# Patient Record
Sex: FEMALE | Race: WHITE | NOT HISPANIC OR LATINO | Employment: FULL TIME | URBAN - METROPOLITAN AREA
[De-identification: names, ages, dates, MRNs, and addresses within clinical notes are randomized per-mention and may not be internally consistent; named-entity substitution may affect disease eponyms.]

---

## 2024-08-26 ENCOUNTER — APPOINTMENT (OUTPATIENT)
Dept: RHEUMATOLOGY | Facility: CLINIC | Age: 34
End: 2024-08-26
Payer: COMMERCIAL

## 2024-08-26 ENCOUNTER — OFFICE VISIT (OUTPATIENT)
Dept: RHEUMATOLOGY | Facility: CLINIC | Age: 34
End: 2024-08-26
Payer: COMMERCIAL

## 2024-08-26 VITALS — SYSTOLIC BLOOD PRESSURE: 112 MMHG | DIASTOLIC BLOOD PRESSURE: 72 MMHG | WEIGHT: 149 LBS

## 2024-08-26 DIAGNOSIS — R76.8 POSITIVE ANA (ANTINUCLEAR ANTIBODY): Primary | ICD-10-CM

## 2024-08-26 PROBLEM — H00.19 CHALAZION: Status: ACTIVE | Noted: 2017-09-21

## 2024-08-26 PROBLEM — J02.9 SORE THROAT: Status: ACTIVE | Noted: 2023-03-23

## 2024-08-26 PROBLEM — R50.9 FEVER: Status: ACTIVE | Noted: 2023-03-23

## 2024-08-26 PROBLEM — R45.851 SUICIDAL IDEATION: Status: ACTIVE | Noted: 2023-09-21

## 2024-08-26 PROCEDURE — 99204 OFFICE O/P NEW MOD 45 MIN: CPT | Performed by: INTERNAL MEDICINE

## 2024-08-26 PROCEDURE — 1036F TOBACCO NON-USER: CPT | Performed by: INTERNAL MEDICINE

## 2024-08-26 RX ORDER — ACYCLOVIR 400 MG/1
400 TABLET ORAL
COMMUNITY

## 2024-08-26 RX ORDER — ZIPRASIDONE HYDROCHLORIDE 20 MG/1
20 CAPSULE ORAL
COMMUNITY
Start: 2023-12-07 | End: 2024-08-26

## 2024-08-26 RX ORDER — TRAZODONE HYDROCHLORIDE 50 MG/1
50 TABLET ORAL DAILY PRN
COMMUNITY
Start: 2023-09-25 | End: 2024-08-26

## 2024-08-26 RX ORDER — CETIRIZINE HYDROCHLORIDE 10 MG/1
1 TABLET ORAL DAILY
COMMUNITY

## 2024-08-26 RX ORDER — DOXYCYCLINE HYCLATE 100 MG
100 TABLET ORAL DAILY
COMMUNITY
Start: 2023-08-19

## 2024-08-26 RX ORDER — ONDANSETRON 4 MG/1
4 TABLET, ORALLY DISINTEGRATING ORAL EVERY 8 HOURS PRN
COMMUNITY
Start: 2024-02-07

## 2024-08-26 RX ORDER — EPINEPHRINE 0.3 MG/.3ML
1 INJECTION SUBCUTANEOUS ONCE AS NEEDED
COMMUNITY
Start: 2024-08-06

## 2024-08-26 RX ORDER — BUSPIRONE HYDROCHLORIDE 10 MG/1
1 TABLET ORAL
COMMUNITY
Start: 2023-09-02 | End: 2024-08-26

## 2024-08-26 RX ORDER — ARIPIPRAZOLE 10 MG/1
10 TABLET ORAL EVERY EVENING
COMMUNITY
Start: 2023-10-20 | End: 2024-08-26

## 2024-08-26 RX ORDER — NEOMYCIN SULFATE, POLYMYXIN B SULFATE, HYDROCORTISONE 3.5; 10000; 1 MG/ML; [USP'U]/ML; MG/ML
SOLUTION/ DROPS AURICULAR (OTIC)
COMMUNITY
Start: 2023-08-19

## 2024-08-26 RX ORDER — HYDROXYZINE PAMOATE 50 MG/1
50 CAPSULE ORAL EVERY 6 HOURS PRN
COMMUNITY
Start: 2023-09-25

## 2024-08-26 RX ORDER — TRIAMCINOLONE ACETONIDE 1 MG/G
OINTMENT TOPICAL
COMMUNITY
Start: 2024-04-25

## 2024-08-26 RX ORDER — MONTELUKAST SODIUM 10 MG/1
1 TABLET ORAL DAILY
COMMUNITY

## 2024-08-26 RX ORDER — FLUCONAZOLE 150 MG/1
150 TABLET ORAL ONCE
COMMUNITY
Start: 2023-08-19

## 2024-08-26 RX ORDER — VENLAFAXINE HYDROCHLORIDE 150 MG/1
150 CAPSULE, EXTENDED RELEASE ORAL
COMMUNITY

## 2024-08-26 RX ORDER — ARIPIPRAZOLE 5 MG/1
5 TABLET ORAL DAILY
COMMUNITY
End: 2024-08-26

## 2024-08-26 ASSESSMENT — PAIN SCALES - GENERAL: PAINLEVEL: 0-NO PAIN

## 2024-08-26 NOTE — PROGRESS NOTES
Subjective  . Shantelle Nixon is a 33 y.o. female who presents for SLE positive.    HPI.  33-year-old female with history of anxiety, depression, SI, PTSD and asthma referred for positive LEIGH ANN.    She states that she has been having off-and-on skin rashes with itching involving the chest, arms for the past 1 year.  She believes that she has had MRSA.  She states since July of this year she has been having photosensitivity, itching and excessive tearing.  She states she has normal eye exam in May.  She stated that she has had bad diarrhea with white speckles  in April that has now subsided.      She reports hair thinning.  She reports cold sores for that she takes valacyclovir.  She believes she has Raynaud's phenomenon.    She has no history of pleuritic chest pain, shortness of breath, fever, chills, night sweats, weight loss, dry eyes, dry mouth, photosensitivity, uveitis iritis, joint pain and swelling, hematemesis, hemoptysis easy bruising and bleeding.    Social history: She denies alcohol use or smoking.  She uses medical marijuana in the form of Gummies.  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  Family history: Both parents has had diabetes.  Father also had high blood pressure and issue with alcohol and drug use.  Surgical history:  section, adenoidectomy, tonsillectomy, vesicoureteral surgery, wisdom teeth extraction and rhinoseptoplasty.    Review of Systems   All other systems reviewed and are negative.    Objective     Blood pressure 112/72, weight 67.6 kg (149 lb).    Physical Exam  Gen. AAO x3, NAD.  HEENT: No pallor or icterus, PERRLA, EOMI. Oropharynx is clear. MM moist,Parotid glands  not enlarged. No cervical lymphadenopathy .  Skin: No rashes.  Heart: S1, S2/ RRR. No murmurs or gallops.  Lungs: CTA B.  Abdomen: Soft, NT/ND, BS regular.  MSK: No.swelling or tenderness of the  upper or lower extremity joints with full ROM, Neck,spine and Carson SI with out tenderness.  Neuro: CN II-XII intact. Sensation to  touch intact.Strength 5/5 throughout. DTR 2+ and symmetrical.  Psych:Appropriate mood and behavior  EXT: No edema    Assessment/Plan . 33-year-old female with history of anxiety, depression, SI, PTSD and asthma presented to the [facility] physician with intermittent skin rash, and diarrhea with white specks about 4 months ago.  Blood work obtained showed positive LEIGH ANN at 1: 60 with negative rheumatoid factor.  Sed rate was within normal limits.  CBC and CMP were satisfactory.    Discussed with patient it appears she has nonspecific positive LEIGH ANN however will obtain labs for further evaluation.    We will communicate with the patient with results.     This note was partially generated using the Dragon Voice recognition system. There may be some incorrect wording, spelling and/or spelling errors or punctuation errors that were not corrected prior to committing the note to the medical record.    Problem List Items Addressed This Visit    None  Visit Diagnoses       Positive LEIGH ANN (antinuclear antibody)    -  Primary    Relevant Orders    DAVID Panel    C4 Complement    C3 Complement                 Active Ambulatory Problems     Diagnosis Date Noted    Chalazion 09/21/2017    Fever 03/23/2023    Sore throat 03/23/2023    Suicidal ideation 09/21/2023     Resolved Ambulatory Problems     Diagnosis Date Noted    No Resolved Ambulatory Problems     No Additional Past Medical History       No family history on file.    History reviewed. No pertinent surgical history.    Social History     Tobacco Use   Smoking Status Never   Smokeless Tobacco Never       Allergies  Iodinated contrast media    Current Meds  Current Outpatient Medications   Medication Instructions    acyclovir (ZOVIRAX) 400 mg, oral    cetirizine (ZyrTEC) 10 mg tablet 1 tablet, oral, Daily    doxycycline (VIBRA-TABS) 100 mg, oral, Daily    EPINEPHrine 0.3 mg/0.3 mL injection syringe 1 Syringe, intramuscular, Once as needed    fluconazole (DIFLUCAN) 150 mg, oral,  Once    hydrOXYzine pamoate (VISTARIL) 50 mg, oral, Every 6 hours PRN    montelukast (Singulair) 10 mg tablet 1 tablet, oral, Daily    neomycin-polymyxin-HC (Cortisporin) otic solution Hydrocortisone/neomycin/polymyxin B 1%-0.35%-10,000 units/mL otic solution    ondansetron ODT (ZOFRAN-ODT) 4 mg, oral, Every 8 hours PRN    triamcinolone (Kenalog) 0.1 % ointment 1 APPLICATION EXTERNALLY TWICE A DAY 30 DAYS    venlafaxine XR (EFFEXOR-XR) 150 mg, oral, Once daily with food        Dylan Swift MD

## 2024-08-27 ENCOUNTER — LAB (OUTPATIENT)
Dept: LAB | Facility: LAB | Age: 34
End: 2024-08-27
Payer: COMMERCIAL

## 2024-08-27 DIAGNOSIS — R76.8 POSITIVE ANA (ANTINUCLEAR ANTIBODY): ICD-10-CM

## 2024-08-27 PROCEDURE — 36415 COLL VENOUS BLD VENIPUNCTURE: CPT

## 2024-08-27 PROCEDURE — 86160 COMPLEMENT ANTIGEN: CPT

## 2024-08-27 PROCEDURE — 86225 DNA ANTIBODY NATIVE: CPT

## 2024-08-27 PROCEDURE — 86235 NUCLEAR ANTIGEN ANTIBODY: CPT

## 2024-08-28 LAB
C3 SERPL-MCNC: 125 MG/DL (ref 87–200)
C4 SERPL-MCNC: 30 MG/DL (ref 10–50)
CENTROMERE B AB SER-ACNC: <0.2 AI
CHROMATIN AB SERPL-ACNC: <0.2 AI
DSDNA AB SER-ACNC: 3 IU/ML
ENA JO1 AB SER QL IA: <0.2 AI
ENA RNP AB SER IA-ACNC: <0.2 AI
ENA SCL70 AB SER QL IA: <0.2 AI
ENA SM AB SER IA-ACNC: <0.2 AI
ENA SM+RNP AB SER QL IA: <0.2 AI
ENA SS-A AB SER IA-ACNC: <0.2 AI
ENA SS-B AB SER IA-ACNC: <0.2 AI
RIBOSOMAL P AB SER-ACNC: <0.2 AI

## 2024-10-15 ENCOUNTER — TELEPHONE (OUTPATIENT)
Dept: GASTROENTEROLOGY | Facility: CLINIC | Age: 34
End: 2024-10-15
Payer: COMMERCIAL

## 2024-10-16 ENCOUNTER — APPOINTMENT (OUTPATIENT)
Dept: RADIOLOGY | Facility: CLINIC | Age: 34
End: 2024-10-16
Payer: COMMERCIAL

## 2024-11-06 ENCOUNTER — APPOINTMENT (OUTPATIENT)
Dept: RHEUMATOLOGY | Facility: CLINIC | Age: 34
End: 2024-11-06
Payer: COMMERCIAL

## 2024-12-18 ENCOUNTER — APPOINTMENT (OUTPATIENT)
Dept: GASTROENTEROLOGY | Facility: CLINIC | Age: 34
End: 2024-12-18
Payer: COMMERCIAL

## 2024-12-18 VITALS
HEART RATE: 84 BPM | SYSTOLIC BLOOD PRESSURE: 120 MMHG | DIASTOLIC BLOOD PRESSURE: 70 MMHG | HEIGHT: 64 IN | BODY MASS INDEX: 25.61 KG/M2 | WEIGHT: 150 LBS | OXYGEN SATURATION: 97 %

## 2024-12-18 DIAGNOSIS — R10.13 EPIGASTRIC ABDOMINAL PAIN: Primary | ICD-10-CM

## 2024-12-18 DIAGNOSIS — R11.2 NAUSEA AND VOMITING, UNSPECIFIED VOMITING TYPE: ICD-10-CM

## 2024-12-18 PROCEDURE — 99204 OFFICE O/P NEW MOD 45 MIN: CPT | Performed by: INTERNAL MEDICINE

## 2024-12-18 PROCEDURE — 3008F BODY MASS INDEX DOCD: CPT | Performed by: INTERNAL MEDICINE

## 2024-12-18 NOTE — H&P (VIEW-ONLY)
[facility]    ASSESSMENT and PLAN:       Shantelle Nixon is a 34 y.o. female with a significant past medical history of anxiety, seasonal allergies who presents for consultation requested by her primary care provider (YUDITH Puri) for the evaluation of nausea vomiting epigastric pain.         1, Epigastric Pain/intractable nausea vomiting  -Cyclic vomiting syndrome patient consuming 350 mg THC daily PUD vs biliary vs gastroparesis vs functional dyspepsia   - EGD for evaluation if negative consider gastric emptying study   - Risk and benefits of EGD including bleeding perforation and infection were discussed with patient and they wish to proceed              Yung Eisenberg DO         Gastroenterology    [facility]            Subjective   HISTORY OF PRESENT ILLNESS:     Chief Complaint  Abdominal Pain (Stools are yellow /Stool testing 8/20/24, US & CT 10/10/24/Colon at age 17, no EGD /Paternal grandfather - colon cancer , maternal grandfather - pancreatic cancer)    History Of Present Illness:    Shantelle Nixon is a 34 y.o. female with a significant past medical history of anxiety, seasonal allergies who presents for consultation requested by her primary care provider (YUDITH Puri) for the evaluation of nausea vomiting epigastric pain.       Patient presents for evaluation epigastric pain it has been ongoing since last fall.  She states she has had intermittent nausea vomiting along some epigastric pain.  She states got progressively worse.  She is undergone right quadrant ultrasound and CT abdomen pelvis not show any acute pathology.  She states happens and primary in the morning in the evenings.  She does states she does use marijuana and consumes 350 mg of THC daily in the form of edibles.          Patient denies any heartburn/GERD, dysphagia, odynophagia, abdominal pain, diarrhea, constipation, hematemesis,  ----- Message from Dileep Milligan PA-C sent at 6/19/2020  4:31 PM CDT -----  Pap is normal with a negative HPV.  Repeat Pap smear in five years.   "hematochezia, melena, or weight loss.      Endoscopy History:    M[facility]s to remote history of colonoscopy at the age 17    Review of systems:   Review of Systems      I performed a complete 10 point review of systems and it is negative except as noted in HPI or above.        PAST HISTORIES:       Past Medical History:  She has no past medical history on file.    Past Surgical History:  She has no past surgical history on file.      Social History:  She reports that she has never smoked. She has never used smokeless tobacco. She reports that she does not currently use drugs. She reports that she does not drink alcohol.    Family History:  No known GI disease, specifically denies pancreatitis, Crohn's, colon cancer, gastroesophageal cancer, or ulcerative colitis.    No family history on file.     Allergies:  Iodinated contrast media        Objective   OBJECTIVE:       Last Recorded Vitals:  Vitals:    12/18/24 1323   BP: 120/70   Pulse: 84   SpO2: 97%   Weight: 68 kg (150 lb)   Height: 1.615 m (5' 3.58\")     /70   Pulse 84   Ht 1.615 m (5' 3.58\")   Wt 68 kg (150 lb)   SpO2 97%   BMI 26.09 kg/m²      Physical Exam:    Physical Exam  Vitals reviewed.   Constitutional:       General: She is awake.      Appearance: Normal appearance.   HENT:      Head: Normocephalic.      Mouth/Throat:      Mouth: Mucous membranes are moist.   Eyes:      Extraocular Movements: Extraocular movements intact.   Cardiovascular:      Rate and Rhythm: Normal rate.      Heart sounds: Normal heart sounds.   Pulmonary:      Effort: Pulmonary effort is normal.      Breath sounds: Normal breath sounds.   Abdominal:      General: Bowel sounds are normal.      Palpations: Abdomen is soft.      Tenderness: There is no abdominal tenderness. There is no guarding or rebound.      Hernia: No hernia is present.   Musculoskeletal:         General: Normal range of motion.      Cervical back: Neck supple.   Skin:     General: Skin is warm and dry. " "  Neurological:      General: No focal deficit present.      Mental Status: She is alert.   Psychiatric:         Attention and Perception: Attention and perception normal.         Mood and Affect: Mood normal.         Behavior: Behavior normal.           Home Medications:  Prior to Admission medications    Medication Sig Start Date End Date Taking? Authorizing Provider   acyclovir (Zovirax) 400 mg tablet Take 1 tablet (400 mg) by mouth.   Yes Historical Provider, MD   cetirizine (ZyrTEC) 10 mg tablet Take 1 tablet (10 mg) by mouth once daily.   Yes Historical Provider, MD   EPINEPHrine 0.3 mg/0.3 mL injection syringe Inject 0.3 mL (0.3 mg) into the muscle 1 time if needed. 8/6/24  Yes Historical Provider, MD   montelukast (Singulair) 10 mg tablet Take 1 tablet (10 mg) by mouth once daily.   Yes Historical Provider, MD   ondansetron ODT (Zofran-ODT) 4 mg disintegrating tablet Dissolve 1 tablet (4 mg) in the mouth every 8 hours if needed for vomiting or nausea. 2/7/24  Yes Historical Provider, MD   triamcinolone (Kenalog) 0.1 % ointment 1 APPLICATION EXTERNALLY TWICE A DAY 30 DAYS 4/25/24  Yes Historical Provider, MD   venlafaxine XR (Effexor-XR) 150 mg 24 hr capsule Take 187.5 mg by mouth. Once daily with food   Yes Historical Provider, MD   doxycycline (Vibra-Tabs) 100 mg tablet Take 1 tablet (100 mg) by mouth once daily. 8/19/23 12/18/24  Historical Provider, MD   fluconazole (Diflucan) 150 mg tablet Take 1 tablet (150 mg) by mouth 1 time. 8/19/23 12/18/24  Historical Provider, MD   hydrOXYzine pamoate (Vistaril) 50 mg capsule Take 1 capsule (50 mg) by mouth every 6 hours if needed. 9/25/23 12/18/24  Historical Provider, MD   neomycin-polymyxin-HC (Cortisporin) otic solution Hydrocortisone/neomycin/polymyxin B 1%-0.35%-10,000 units/mL otic solution 8/19/23 12/18/24  Historical Provider, MD         Relevant Results Recent labs reviewed in the EMR.  No results found for: \"HGB\", \"MCV\", \"PLT\"    No results found for: " "\"FERRITIN\", \"IRON\"    No results found for: \"NA\", \"K\", \"CL\", \"BUN\", \"CREATININE\"    No results found for: \"BILITOT\"  No results found for: \"ALT\", \"AST\", \"ALKPHOS\"    No results found for: \"CRP\"    No results found for: \"CALPS\"    Radiology: Reviewed imaging reviewed in the EMR.  CT ABDOMEN AND PELVIS WITH CONTRAST  EXAM DATE AND TIME:  10/10/2024 2:12 PM EDT  INDICATION: 34 years Female with severe abd pain. Nausea and vomiting.  COMPARISON: CT abdomen pelvis from 2009  TECHNIQUE: Transaxial sequence through the abdomen and pelvis with 3 mm reconstruction with dynamic intravenous infusion of intravenous contrast media. Coronal and sagittal reconstructions included. Dose reduction was employed with automated exposure control. Dose reduction was employed with automated exposure control.    FINDINGS:    Chest base: Normal.    Liver: Normal size and contour. 5 mm left hepatic lobe hypodensity is too small to definitively characterize, but likely represents a small cyst. No follow-up imaging is recommended.    Biliary tree:  No calcified gallstones. No significant biliary ductal dilatation. No pericholecystic fluid..    Pancreas: Normal.    Spleen: Normal.      Adrenals: Normal.    Kidneys: Symmetric contrast enhancement without hydronephrosis.  No focal lesion.    Free fluid:  Trace free fluid within the pelvis, within normal physiologic range.      Vasculature: Normal caliber.    Bowel: No bowel obstruction. Normal appendix. No bowel wall pneumatosis..    Lymphadenopathy: None.    Pelvic organs/viscera: Uterus is retroverted and retroflexed. Urinary bladder appears within normal limits. No adnexal mass.    Osseous structures: No abnormality.    Soft Tissues: Normal.  Procedure Note    Mariel Wolff MD - 10/10/2024  Formatting of this note might be different from the original.  Patient Name: OMER FLETCHER  : 1990  MRN: [id number]  Acct#: [id number]  Accession: [id number]  Exam Date/Time: 10/10/2024 " 14:12  Procedure: CT ABDOMEN PELVIS W CONTRAST  Ordering Provider: VEGA COURTNEY  Reason For Exam: severe abd pain      CT ABDOMEN AND PELVIS WITH CONTRAST  EXAM DATE AND TIME:  10/10/2024 2:12 PM EDT  INDICATION: 34 years Female with severe abd pain. Nausea and vomiting.  COMPARISON: CT abdomen pelvis from 9/25/2009  TECHNIQUE: Transaxial sequence through the abdomen and pelvis with 3 mm reconstruction with dynamic intravenous infusion of intravenous contrast media. Coronal and sagittal reconstructions included. Dose reduction was employed with automated exposure control. Dose reduction was employed with automated exposure control.    FINDINGS:    Chest base: Normal.    Liver: Normal size and contour. 5 mm left hepatic lobe hypodensity is too small to definitively characterize, but likely represents a small cyst. No follow-up imaging is recommended.    Biliary tree:  No calcified gallstones. No significant biliary ductal dilatation. No pericholecystic fluid..    Pancreas: Normal.    Spleen: Normal.      Adrenals: Normal.    Kidneys: Symmetric contrast enhancement without hydronephrosis.  No focal lesion.    Free fluid:  Trace free fluid within the pelvis, within normal physiologic range.      Vasculature: Normal caliber.    Bowel: No bowel obstruction. Normal appendix. No bowel wall pneumatosis..    Lymphadenopathy: None.    Pelvic organs/viscera: Uterus is retroverted and retroflexed. Urinary bladder appears within normal limits. No adnexal mass.    Osseous structures: No abnormality.    Soft Tissues: Normal.    IMPRESSION:  No acute abnormality within the abdomen/pelvis.       Examination:   Right upper quadrant Ultrasound     Indication:   upper abd pain, elevated AST/ALT     Findings:   Multiple static gray scale and color Doppler sonographic images of the right upper abdomen are submitted for interpretation.     Small hepatic cyst in the left lobe measures approximately 8mm. Otherwise, the liver  is unremarkable on the provided images.  There is no intrahepatic biliary ductal dilatation appreciated.       The gallbladder is unremarkable, without evidence of gallstones, pericholecystic fluid or gallbladder wall thickening.  The common duct measures 4.6 mm.   Negative sonographic Andrews's sign was documented by the sonographer.       The pancreas is not well visualized on this study secondary to bowel gas or technical factors.     The right kidney measures  9.2 x 5.2 x 4.4 cm on screening images and does not demonstrate evidence of hydronephrosis.

## 2024-12-18 NOTE — PATIENT INSTRUCTIONS
You have been scheduled for an upper endoscopy (EGD).  You were given instructions for preparing for this test in the office today.  If you have questions about these instructions, please call my office at [phone].    After your procedure, you can expect me to talk to you to go over the results of the procedure.    You were also given information regarding the schedule for your procedure including the time that you need to arrive to the endoscopy unit.  You will also be contacted 2-3 day prior to your procedure to confirm the final arrival time.  If you have questions about this or if you need to cancel or change this appointment please call my office at [phone].

## 2024-12-18 NOTE — PROGRESS NOTES
[facility]    ASSESSMENT and PLAN:       Shantelle Nixon is a 34 y.o. female with a significant past medical history of anxiety, seasonal allergies who presents for consultation requested by her primary care provider (YUDITH Puri) for the evaluation of nausea vomiting epigastric pain.         1, Epigastric Pain/intractable nausea vomiting  -Cyclic vomiting syndrome patient consuming 350 mg THC daily PUD vs biliary vs gastroparesis vs functional dyspepsia   - EGD for evaluation if negative consider gastric emptying study   - Risk and benefits of EGD including bleeding perforation and infection were discussed with patient and they wish to proceed              Yung Eisenberg DO         Gastroenterology    [facility]            Subjective   HISTORY OF PRESENT ILLNESS:     Chief Complaint  Abdominal Pain (Stools are yellow /Stool testing 8/20/24, US & CT 10/10/24/Colon at age 17, no EGD /Paternal grandfather - colon cancer , maternal grandfather - pancreatic cancer)    History Of Present Illness:    Shanetlle Nixon is a 34 y.o. female with a significant past medical history of anxiety, seasonal allergies who presents for consultation requested by her primary care provider (YUDITH Puri) for the evaluation of nausea vomiting epigastric pain.       Patient presents for evaluation epigastric pain it has been ongoing since last fall.  She states she has had intermittent nausea vomiting along some epigastric pain.  She states got progressively worse.  She is undergone right quadrant ultrasound and CT abdomen pelvis not show any acute pathology.  She states happens and primary in the morning in the evenings.  She does states she does use marijuana and consumes 350 mg of THC daily in the form of edibles.          Patient denies any heartburn/GERD, dysphagia, odynophagia, abdominal pain, diarrhea, constipation, hematemesis,  Detail Level: Detailed "hematochezia, melena, or weight loss.      Endoscopy History:    Mitts to remote history of colonoscopy at the age 17    Review of systems:   Review of Systems      I performed a complete 10 point review of systems and it is negative except as noted in HPI or above.        PAST HISTORIES:       Past Medical History:  She has no past medical history on file.    Past Surgical History:  She has no past surgical history on file.      Social History:  She reports that she has never smoked. She has never used smokeless tobacco. She reports that she does not currently use drugs. She reports that she does not drink alcohol.    Family History:  No known GI disease, specifically denies pancreatitis, Crohn's, colon cancer, gastroesophageal cancer, or ulcerative colitis.    No family history on file.     Allergies:  Iodinated contrast media        Objective   OBJECTIVE:       Last Recorded Vitals:  Vitals:    12/18/24 1323   BP: 120/70   Pulse: 84   SpO2: 97%   Weight: 68 kg (150 lb)   Height: 1.615 m (5' 3.58\")     /70   Pulse 84   Ht 1.615 m (5' 3.58\")   Wt 68 kg (150 lb)   SpO2 97%   BMI 26.09 kg/m²      Physical Exam:    Physical Exam  Vitals reviewed.   Constitutional:       General: She is awake.      Appearance: Normal appearance.   HENT:      Head: Normocephalic.      Mouth/Throat:      Mouth: Mucous membranes are moist.   Eyes:      Extraocular Movements: Extraocular movements intact.   Cardiovascular:      Rate and Rhythm: Normal rate.      Heart sounds: Normal heart sounds.   Pulmonary:      Effort: Pulmonary effort is normal.      Breath sounds: Normal breath sounds.   Abdominal:      General: Bowel sounds are normal.      Palpations: Abdomen is soft.      Tenderness: There is no abdominal tenderness. There is no guarding or rebound.      Hernia: No hernia is present.   Musculoskeletal:         General: Normal range of motion.      Cervical back: Neck supple.   Skin:     General: Skin is warm and dry. " Quality 131: Pain Assessment And Follow-Up: Pain assessment using a standardized tool is documented as negative, no follow-up plan required "  Neurological:      General: No focal deficit present.      Mental Status: She is alert.   Psychiatric:         Attention and Perception: Attention and perception normal.         Mood and Affect: Mood normal.         Behavior: Behavior normal.           Home Medications:  Prior to Admission medications    Medication Sig Start Date End Date Taking? Authorizing Provider   acyclovir (Zovirax) 400 mg tablet Take 1 tablet (400 mg) by mouth.   Yes Historical Provider, MD   cetirizine (ZyrTEC) 10 mg tablet Take 1 tablet (10 mg) by mouth once daily.   Yes Historical Provider, MD   EPINEPHrine 0.3 mg/0.3 mL injection syringe Inject 0.3 mL (0.3 mg) into the muscle 1 time if needed. 8/6/24  Yes Historical Provider, MD   montelukast (Singulair) 10 mg tablet Take 1 tablet (10 mg) by mouth once daily.   Yes Historical Provider, MD   ondansetron ODT (Zofran-ODT) 4 mg disintegrating tablet Dissolve 1 tablet (4 mg) in the mouth every 8 hours if needed for vomiting or nausea. 2/7/24  Yes Historical Provider, MD   triamcinolone (Kenalog) 0.1 % ointment 1 APPLICATION EXTERNALLY TWICE A DAY 30 DAYS 4/25/24  Yes Historical Provider, MD   venlafaxine XR (Effexor-XR) 150 mg 24 hr capsule Take 187.5 mg by mouth. Once daily with food   Yes Historical Provider, MD   doxycycline (Vibra-Tabs) 100 mg tablet Take 1 tablet (100 mg) by mouth once daily. 8/19/23 12/18/24  Historical Provider, MD   fluconazole (Diflucan) 150 mg tablet Take 1 tablet (150 mg) by mouth 1 time. 8/19/23 12/18/24  Historical Provider, MD   hydrOXYzine pamoate (Vistaril) 50 mg capsule Take 1 capsule (50 mg) by mouth every 6 hours if needed. 9/25/23 12/18/24  Historical Provider, MD   neomycin-polymyxin-HC (Cortisporin) otic solution Hydrocortisone/neomycin/polymyxin B 1%-0.35%-10,000 units/mL otic solution 8/19/23 12/18/24  Historical Provider, MD         Relevant Results Recent labs reviewed in the EMR.  No results found for: \"HGB\", \"MCV\", \"PLT\"    No results found for: " Quality 110: Preventive Care And Screening: Influenza Immunization: Influenza Immunization Administered during Influenza season "\"FERRITIN\", \"IRON\"    No results found for: \"NA\", \"K\", \"CL\", \"BUN\", \"CREATININE\"    No results found for: \"BILITOT\"  No results found for: \"ALT\", \"AST\", \"ALKPHOS\"    No results found for: \"CRP\"    No results found for: \"CALPS\"    Radiology: Reviewed imaging reviewed in the EMR.  CT ABDOMEN AND PELVIS WITH CONTRAST  EXAM DATE AND TIME:  10/10/2024 2:12 PM EDT  INDICATION: 34 years Female with severe abd pain. Nausea and vomiting.  COMPARISON: CT abdomen pelvis from 2009  TECHNIQUE: Transaxial sequence through the abdomen and pelvis with 3 mm reconstruction with dynamic intravenous infusion of intravenous contrast media. Coronal and sagittal reconstructions included. Dose reduction was employed with automated exposure control. Dose reduction was employed with automated exposure control.    FINDINGS:    Chest base: Normal.    Liver: Normal size and contour. 5 mm left hepatic lobe hypodensity is too small to definitively characterize, but likely represents a small cyst. No follow-up imaging is recommended.    Biliary tree:  No calcified gallstones. No significant biliary ductal dilatation. No pericholecystic fluid..    Pancreas: Normal.    Spleen: Normal.      Adrenals: Normal.    Kidneys: Symmetric contrast enhancement without hydronephrosis.  No focal lesion.    Free fluid:  Trace free fluid within the pelvis, within normal physiologic range.      Vasculature: Normal caliber.    Bowel: No bowel obstruction. Normal appendix. No bowel wall pneumatosis..    Lymphadenopathy: None.    Pelvic organs/viscera: Uterus is retroverted and retroflexed. Urinary bladder appears within normal limits. No adnexal mass.    Osseous structures: No abnormality.    Soft Tissues: Normal.  Procedure Note    Mariel Wolff MD - 10/10/2024  Formatting of this note might be different from the original.  Patient Name: OMER FLETCHER  : 1990  MRN: [id number]  Acct#: [id number]  Accession: [id number]  Exam Date/Time: 10/10/2024 " 14:12  Procedure: CT ABDOMEN PELVIS W CONTRAST  Ordering Provider: VEGA COURTNEY  Reason For Exam: severe abd pain      CT ABDOMEN AND PELVIS WITH CONTRAST  EXAM DATE AND TIME:  10/10/2024 2:12 PM EDT  INDICATION: 34 years Female with severe abd pain. Nausea and vomiting.  COMPARISON: CT abdomen pelvis from 9/25/2009  TECHNIQUE: Transaxial sequence through the abdomen and pelvis with 3 mm reconstruction with dynamic intravenous infusion of intravenous contrast media. Coronal and sagittal reconstructions included. Dose reduction was employed with automated exposure control. Dose reduction was employed with automated exposure control.    FINDINGS:    Chest base: Normal.    Liver: Normal size and contour. 5 mm left hepatic lobe hypodensity is too small to definitively characterize, but likely represents a small cyst. No follow-up imaging is recommended.    Biliary tree:  No calcified gallstones. No significant biliary ductal dilatation. No pericholecystic fluid..    Pancreas: Normal.    Spleen: Normal.      Adrenals: Normal.    Kidneys: Symmetric contrast enhancement without hydronephrosis.  No focal lesion.    Free fluid:  Trace free fluid within the pelvis, within normal physiologic range.      Vasculature: Normal caliber.    Bowel: No bowel obstruction. Normal appendix. No bowel wall pneumatosis..    Lymphadenopathy: None.    Pelvic organs/viscera: Uterus is retroverted and retroflexed. Urinary bladder appears within normal limits. No adnexal mass.    Osseous structures: No abnormality.    Soft Tissues: Normal.    IMPRESSION:  No acute abnormality within the abdomen/pelvis.       Examination:   Right upper quadrant Ultrasound     Indication:   upper abd pain, elevated AST/ALT     Findings:   Multiple static gray scale and color Doppler sonographic images of the right upper abdomen are submitted for interpretation.     Small hepatic cyst in the left lobe measures approximately 8mm. Otherwise, the liver  is unremarkable on the provided images.  There is no intrahepatic biliary ductal dilatation appreciated.       The gallbladder is unremarkable, without evidence of gallstones, pericholecystic fluid or gallbladder wall thickening.  The common duct measures 4.6 mm.   Negative sonographic Andrews's sign was documented by the sonographer.       The pancreas is not well visualized on this study secondary to bowel gas or technical factors.     The right kidney measures  9.2 x 5.2 x 4.4 cm on screening images and does not demonstrate evidence of hydronephrosis.

## 2024-12-30 ENCOUNTER — ANESTHESIA EVENT (OUTPATIENT)
Dept: GASTROENTEROLOGY | Facility: HOSPITAL | Age: 34
End: 2024-12-30
Payer: COMMERCIAL

## 2024-12-31 ENCOUNTER — HOSPITAL ENCOUNTER (OUTPATIENT)
Dept: GASTROENTEROLOGY | Facility: HOSPITAL | Age: 34
Discharge: HOME | End: 2024-12-31
Payer: COMMERCIAL

## 2024-12-31 ENCOUNTER — ANESTHESIA (OUTPATIENT)
Dept: GASTROENTEROLOGY | Facility: HOSPITAL | Age: 34
End: 2024-12-31
Payer: COMMERCIAL

## 2024-12-31 VITALS
HEART RATE: 86 BPM | SYSTOLIC BLOOD PRESSURE: 111 MMHG | DIASTOLIC BLOOD PRESSURE: 79 MMHG | HEIGHT: 62 IN | TEMPERATURE: 97.8 F | BODY MASS INDEX: 27.6 KG/M2 | WEIGHT: 150 LBS | OXYGEN SATURATION: 96 % | RESPIRATION RATE: 16 BRPM

## 2024-12-31 DIAGNOSIS — R11.2 NAUSEA AND VOMITING, UNSPECIFIED VOMITING TYPE: ICD-10-CM

## 2024-12-31 DIAGNOSIS — R10.13 EPIGASTRIC ABDOMINAL PAIN: ICD-10-CM

## 2024-12-31 PROBLEM — F41.9 ANXIETY: Status: ACTIVE | Noted: 2024-12-31

## 2024-12-31 PROCEDURE — 43235 EGD DIAGNOSTIC BRUSH WASH: CPT | Performed by: INTERNAL MEDICINE

## 2024-12-31 PROCEDURE — 3700000002 HC GENERAL ANESTHESIA TIME - EACH INCREMENTAL 1 MINUTE

## 2024-12-31 PROCEDURE — 3700000001 HC GENERAL ANESTHESIA TIME - INITIAL BASE CHARGE

## 2024-12-31 PROCEDURE — 7100000009 HC PHASE TWO TIME - INITIAL BASE CHARGE

## 2024-12-31 PROCEDURE — 7100000010 HC PHASE TWO TIME - EACH INCREMENTAL 1 MINUTE

## 2024-12-31 PROCEDURE — 2500000004 HC RX 250 GENERAL PHARMACY W/ HCPCS (ALT 636 FOR OP/ED): Performed by: NURSE ANESTHETIST, CERTIFIED REGISTERED

## 2024-12-31 RX ORDER — LIDOCAINE HYDROCHLORIDE 20 MG/ML
INJECTION, SOLUTION EPIDURAL; INFILTRATION; INTRACAUDAL; PERINEURAL AS NEEDED
Status: DISCONTINUED | OUTPATIENT
Start: 2024-12-31 | End: 2024-12-31

## 2024-12-31 RX ORDER — PROPOFOL 10 MG/ML
INJECTION, EMULSION INTRAVENOUS AS NEEDED
Status: DISCONTINUED | OUTPATIENT
Start: 2024-12-31 | End: 2024-12-31

## 2024-12-31 RX ORDER — SODIUM CHLORIDE 0.9 % (FLUSH) 0.9 %
SYRINGE (ML) INJECTION AS NEEDED
Status: DISCONTINUED | OUTPATIENT
Start: 2024-12-31 | End: 2024-12-31

## 2024-12-31 RX ORDER — FENTANYL CITRATE 50 UG/ML
INJECTION, SOLUTION INTRAMUSCULAR; INTRAVENOUS AS NEEDED
Status: DISCONTINUED | OUTPATIENT
Start: 2024-12-31 | End: 2024-12-31

## 2024-12-31 RX ADMIN — FENTANYL CITRATE 50 MCG: 50 INJECTION INTRAMUSCULAR; INTRAVENOUS at 09:31

## 2024-12-31 RX ADMIN — PROPOFOL 50 MG: 10 INJECTION, EMULSION INTRAVENOUS at 09:35

## 2024-12-31 RX ADMIN — PROPOFOL 100 MG: 10 INJECTION, EMULSION INTRAVENOUS at 09:31

## 2024-12-31 RX ADMIN — FENTANYL CITRATE 25 MCG: 50 INJECTION INTRAMUSCULAR; INTRAVENOUS at 09:35

## 2024-12-31 RX ADMIN — LIDOCAINE HYDROCHLORIDE 40 MG: 20 INJECTION, SOLUTION EPIDURAL; INFILTRATION; INTRACAUDAL; PERINEURAL at 09:31

## 2024-12-31 RX ADMIN — Medication 2 ML: at 09:31

## 2024-12-31 RX ADMIN — FENTANYL CITRATE 25 MCG: 50 INJECTION INTRAMUSCULAR; INTRAVENOUS at 09:34

## 2024-12-31 RX ADMIN — Medication 2 ML: at 09:34

## 2024-12-31 RX ADMIN — PROPOFOL 50 MG: 10 INJECTION, EMULSION INTRAVENOUS at 09:34

## 2024-12-31 RX ADMIN — Medication 2 ML: at 09:35

## 2024-12-31 SDOH — HEALTH STABILITY: MENTAL HEALTH: CURRENT SMOKER: 0

## 2024-12-31 ASSESSMENT — COLUMBIA-SUICIDE SEVERITY RATING SCALE - C-SSRS
6. HAVE YOU EVER DONE ANYTHING, STARTED TO DO ANYTHING, OR PREPARED TO DO ANYTHING TO END YOUR LIFE?: NO
1. IN THE PAST MONTH, HAVE YOU WISHED YOU WERE DEAD OR WISHED YOU COULD GO TO SLEEP AND NOT WAKE UP?: NO
2. HAVE YOU ACTUALLY HAD ANY THOUGHTS OF KILLING YOURSELF?: NO

## 2024-12-31 ASSESSMENT — PAIN SCALES - GENERAL
PAINLEVEL_OUTOF10: 0 - NO PAIN
PAIN_LEVEL: 0
PAINLEVEL_OUTOF10: 0 - NO PAIN
PAINLEVEL_OUTOF10: 0 - NO PAIN

## 2024-12-31 ASSESSMENT — PAIN - FUNCTIONAL ASSESSMENT: PAIN_FUNCTIONAL_ASSESSMENT: 0-10

## 2024-12-31 NOTE — ANESTHESIA PREPROCEDURE EVALUATION
Patient: Shantelle Nixon    Procedure Information       Anesthesia Start Date/Time: 12/31/24 0925    Scheduled providers: Yung Eisenberg DO    Procedure: EGD    Location:  [facility]            Relevant Problems   Anesthesia (within normal limits)      Cardiac (within normal limits)      Pulmonary (within normal limits)      Neuro   (+) Anxiety      GI (within normal limits)      /Renal (within normal limits)      Liver (within normal limits)      Endocrine (within normal limits)      Hematology (within normal limits)      Musculoskeletal (within normal limits)      HEENT (within normal limits)      ID (within normal limits)      Skin (within normal limits)      GYN (within normal limits)       Clinical information reviewed:   Tobacco  Allergies  Meds   Med Hx  Surg Hx   Fam Hx  Soc Hx        NPO Detail:  NPO/Void Status  Date of Last Liquid: 12/30/24  Time of Last Liquid: 2100  Date of Last Solid: 12/30/24  Time of Last Solid: 2300  Last Intake Type: Clear fluids         Physical Exam    Airway  Mallampati: II  TM distance: >3 FB  Neck ROM: full     Cardiovascular - normal exam  Rhythm: regular  Rate: normal     Dental - normal exam     Pulmonary - normal exam     Abdominal - normal exam             Anesthesia Plan    History of general anesthesia?: yes  History of complications of general anesthesia?: no    ASA 2     MAC     The patient is not a current smoker.    intravenous induction   Anesthetic plan and risks discussed with patient.

## 2024-12-31 NOTE — ANESTHESIA POSTPROCEDURE EVALUATION
Patient: Shantelle Nixon    Procedure Summary       Date: 12/31/24 Room / Location: [facility]    Anesthesia Start: 0925 Anesthesia Stop: 0942    Procedure: EGD Diagnosis:       Epigastric abdominal pain      Nausea and vomiting, unspecified vomiting type    Scheduled Providers: Yung Eisenberg DO Responsible Provider: VICTOR M Hankins    Anesthesia Type: MAC ASA Status: 2            Anesthesia Type: MAC    Vitals Value Taken Time   /77 12/31/24 0951   Temp 36.6 °C (97.8 °F) 12/31/24 0941   Pulse 93 12/31/24 0951   Resp 16 12/31/24 0951   SpO2 96 % 12/31/24 0951       Anesthesia Post Evaluation    Patient location during evaluation: bedside  Patient participation: complete - patient participated  Level of consciousness: awake and alert  Pain score: 0  Pain management: adequate  Airway patency: patent  Cardiovascular status: acceptable and hemodynamically stable  Respiratory status: acceptable  Hydration status: acceptable  Postoperative Nausea and Vomiting: none        There were no known notable events for this encounter.

## 2025-01-23 ENCOUNTER — HOSPITAL ENCOUNTER (OUTPATIENT)
Dept: RADIOLOGY | Facility: HOSPITAL | Age: 35
Discharge: HOME | End: 2025-01-23
Payer: COMMERCIAL

## 2025-01-23 DIAGNOSIS — R11.2 NAUSEA AND VOMITING, UNSPECIFIED VOMITING TYPE: ICD-10-CM

## 2025-01-23 PROCEDURE — A9541 TC99M SULFUR COLLOID: HCPCS | Performed by: RADIOLOGY

## 2025-01-23 PROCEDURE — 3430000001 HC RX 343 DIAGNOSTIC RADIOPHARMACEUTICALS: Performed by: RADIOLOGY

## 2025-01-23 PROCEDURE — 78264 GASTRIC EMPTYING IMG STUDY: CPT | Performed by: RADIOLOGY

## 2025-01-23 PROCEDURE — 78264 GASTRIC EMPTYING IMG STUDY: CPT

## 2025-01-23 RX ADMIN — TECHNETIUM TC 99M SULFUR COLLOID 1 MILLICURIE: KIT at 09:50

## 2025-02-13 ENCOUNTER — APPOINTMENT (OUTPATIENT)
Dept: RHEUMATOLOGY | Facility: CLINIC | Age: 35
End: 2025-02-13
Payer: COMMERCIAL

## 2025-07-05 ENCOUNTER — HOSPITAL ENCOUNTER (EMERGENCY)
Facility: HOSPITAL | Age: 35
Discharge: HOME | End: 2025-07-06
Attending: STUDENT IN AN ORGANIZED HEALTH CARE EDUCATION/TRAINING PROGRAM
Payer: COMMERCIAL

## 2025-07-05 ENCOUNTER — APPOINTMENT (OUTPATIENT)
Dept: RADIOLOGY | Facility: HOSPITAL | Age: 35
End: 2025-07-05
Payer: COMMERCIAL

## 2025-07-05 DIAGNOSIS — R55 SYNCOPE, UNSPECIFIED SYNCOPE TYPE: ICD-10-CM

## 2025-07-05 DIAGNOSIS — E87.6 HYPOKALEMIA: Primary | ICD-10-CM

## 2025-07-05 LAB
BASOPHILS # BLD AUTO: 0.07 X10*3/UL (ref 0–0.1)
BASOPHILS NFR BLD AUTO: 0.9 %
EOSINOPHIL # BLD AUTO: 0.37 X10*3/UL (ref 0–0.7)
EOSINOPHIL NFR BLD AUTO: 4.6 %
ERYTHROCYTE [DISTWIDTH] IN BLOOD BY AUTOMATED COUNT: 10.9 % (ref 11.5–14.5)
HCT VFR BLD AUTO: 39.4 % (ref 36–46)
HGB BLD-MCNC: 13.7 G/DL (ref 12–16)
IMM GRANULOCYTES # BLD AUTO: 0.02 X10*3/UL (ref 0–0.7)
IMM GRANULOCYTES NFR BLD AUTO: 0.3 % (ref 0–0.9)
LYMPHOCYTES # BLD AUTO: 2.85 X10*3/UL (ref 1.2–4.8)
LYMPHOCYTES NFR BLD AUTO: 35.8 %
MCH RBC QN AUTO: 30.9 PG (ref 26–34)
MCHC RBC AUTO-ENTMCNC: 34.8 G/DL (ref 32–36)
MCV RBC AUTO: 89 FL (ref 80–100)
MONOCYTES # BLD AUTO: 0.65 X10*3/UL (ref 0.1–1)
MONOCYTES NFR BLD AUTO: 8.2 %
NEUTROPHILS # BLD AUTO: 4.01 X10*3/UL (ref 1.2–7.7)
NEUTROPHILS NFR BLD AUTO: 50.2 %
NRBC BLD-RTO: 0 /100 WBCS (ref 0–0)
PLATELET # BLD AUTO: 254 X10*3/UL (ref 150–450)
RBC # BLD AUTO: 4.44 X10*6/UL (ref 4–5.2)
WBC # BLD AUTO: 8 X10*3/UL (ref 4.4–11.3)

## 2025-07-05 PROCEDURE — 93005 ELECTROCARDIOGRAM TRACING: CPT

## 2025-07-05 PROCEDURE — 36415 COLL VENOUS BLD VENIPUNCTURE: CPT | Performed by: STUDENT IN AN ORGANIZED HEALTH CARE EDUCATION/TRAINING PROGRAM

## 2025-07-05 PROCEDURE — 85025 COMPLETE CBC W/AUTO DIFF WBC: CPT | Performed by: STUDENT IN AN ORGANIZED HEALTH CARE EDUCATION/TRAINING PROGRAM

## 2025-07-05 PROCEDURE — 71046 X-RAY EXAM CHEST 2 VIEWS: CPT

## 2025-07-05 PROCEDURE — 83880 ASSAY OF NATRIURETIC PEPTIDE: CPT | Performed by: STUDENT IN AN ORGANIZED HEALTH CARE EDUCATION/TRAINING PROGRAM

## 2025-07-05 PROCEDURE — 80048 BASIC METABOLIC PNL TOTAL CA: CPT | Performed by: STUDENT IN AN ORGANIZED HEALTH CARE EDUCATION/TRAINING PROGRAM

## 2025-07-05 PROCEDURE — 99285 EMERGENCY DEPT VISIT HI MDM: CPT | Mod: 25 | Performed by: STUDENT IN AN ORGANIZED HEALTH CARE EDUCATION/TRAINING PROGRAM

## 2025-07-05 PROCEDURE — 84484 ASSAY OF TROPONIN QUANT: CPT | Performed by: STUDENT IN AN ORGANIZED HEALTH CARE EDUCATION/TRAINING PROGRAM

## 2025-07-06 ENCOUNTER — APPOINTMENT (OUTPATIENT)
Dept: CARDIOLOGY | Facility: HOSPITAL | Age: 35
End: 2025-07-06
Payer: COMMERCIAL

## 2025-07-06 VITALS
RESPIRATION RATE: 19 BRPM | HEART RATE: 81 BPM | SYSTOLIC BLOOD PRESSURE: 110 MMHG | OXYGEN SATURATION: 98 % | TEMPERATURE: 98.1 F | DIASTOLIC BLOOD PRESSURE: 80 MMHG

## 2025-07-06 LAB
ANION GAP SERPL CALC-SCNC: 14 MMOL/L (ref 10–20)
BNP SERPL-MCNC: 11 PG/ML (ref 0–99)
BUN SERPL-MCNC: 11 MG/DL (ref 6–23)
CALCIUM SERPL-MCNC: 9.2 MG/DL (ref 8.6–10.3)
CARDIAC TROPONIN I PNL SERPL HS: <3 NG/L (ref 0–13)
CARDIAC TROPONIN I PNL SERPL HS: <3 NG/L (ref 0–13)
CHLORIDE SERPL-SCNC: 106 MMOL/L (ref 98–107)
CO2 SERPL-SCNC: 20 MMOL/L (ref 21–32)
CREAT SERPL-MCNC: 0.84 MG/DL (ref 0.5–1.05)
EGFRCR SERPLBLD CKD-EPI 2021: >90 ML/MIN/1.73M*2
GLUCOSE SERPL-MCNC: 71 MG/DL (ref 74–99)
POTASSIUM SERPL-SCNC: 3.1 MMOL/L (ref 3.5–5.3)
SODIUM SERPL-SCNC: 137 MMOL/L (ref 136–145)

## 2025-07-06 PROCEDURE — 84484 ASSAY OF TROPONIN QUANT: CPT | Performed by: STUDENT IN AN ORGANIZED HEALTH CARE EDUCATION/TRAINING PROGRAM

## 2025-07-06 PROCEDURE — 2500000002 HC RX 250 W HCPCS SELF ADMINISTERED DRUGS (ALT 637 FOR MEDICARE OP, ALT 636 FOR OP/ED): Performed by: STUDENT IN AN ORGANIZED HEALTH CARE EDUCATION/TRAINING PROGRAM

## 2025-07-06 PROCEDURE — 71046 X-RAY EXAM CHEST 2 VIEWS: CPT | Performed by: RADIOLOGY

## 2025-07-06 PROCEDURE — 96365 THER/PROPH/DIAG IV INF INIT: CPT

## 2025-07-06 PROCEDURE — 96366 THER/PROPH/DIAG IV INF ADDON: CPT

## 2025-07-06 PROCEDURE — 2500000004 HC RX 250 GENERAL PHARMACY W/ HCPCS (ALT 636 FOR OP/ED): Performed by: STUDENT IN AN ORGANIZED HEALTH CARE EDUCATION/TRAINING PROGRAM

## 2025-07-06 PROCEDURE — 36415 COLL VENOUS BLD VENIPUNCTURE: CPT | Performed by: STUDENT IN AN ORGANIZED HEALTH CARE EDUCATION/TRAINING PROGRAM

## 2025-07-06 RX ORDER — POTASSIUM CHLORIDE 750 MG/1
40 TABLET, FILM COATED, EXTENDED RELEASE ORAL ONCE
Status: COMPLETED | OUTPATIENT
Start: 2025-07-06 | End: 2025-07-06

## 2025-07-06 RX ORDER — MAGNESIUM SULFATE HEPTAHYDRATE 40 MG/ML
2 INJECTION, SOLUTION INTRAVENOUS ONCE
Status: COMPLETED | OUTPATIENT
Start: 2025-07-06 | End: 2025-07-06

## 2025-07-06 RX ADMIN — SODIUM CHLORIDE 500 ML: 0.9 INJECTION, SOLUTION INTRAVENOUS at 00:42

## 2025-07-06 RX ADMIN — POTASSIUM CHLORIDE 40 MEQ: 750 TABLET, FILM COATED, EXTENDED RELEASE ORAL at 02:43

## 2025-07-06 RX ADMIN — MAGNESIUM SULFATE HEPTAHYDRATE 2 G: 40 INJECTION, SOLUTION INTRAVENOUS at 00:41

## 2025-07-06 NOTE — ED TRIAGE NOTES
Pt was out to dinner and had a half a drink. Pt then felt like she ewas going to throw up so she went outside. When her friend found her she was slumped over a bench. When ems arrived she was unresponsive. Pt arrives responsive and axox4

## 2025-07-06 NOTE — ED PROVIDER NOTES
Emergency Department Provider Note       History of Present Illness     History provided by: Patient  Limitations to History: None  External Records Reviewed with Brief Summary: None    HPI:  Shantelle Nixon is a 34 y.o. female presents ED after syncopal episode.  Patient says she went to eat dinner when she felt nauseous went out to the parking lot and felt sick.  She then sat down and thinks she passed out.  Her friend came out found her on the ground.  She denies any chest pain or shortness of breath.  She feels tired now but otherwise back to normal.  Denies headache.  No fever cough or cold symptoms.  No vomiting abdominal pain or diarrhea.  No black or bloody bowel moods.  No leg swelling.  She did states she drink one half of a alcoholic drink this night.  She also uses marijuana Gummies each night.  She is also been under a lot of stress.    Physical Exam   Triage vitals:  T 36.7 °C (98.1 °F)  HR 98  /80  RR 16  O2 96 % None (Room air)    Physical Exam  Vitals and nursing note reviewed.   Constitutional:       General: She is not in acute distress.     Appearance: She is well-developed. She is not ill-appearing.   HENT:      Head: Atraumatic.   Eyes:      Extraocular Movements: Extraocular movements intact.   Cardiovascular:      Rate and Rhythm: Normal rate and regular rhythm.      Pulses: Normal pulses.      Heart sounds: No murmur heard.  Pulmonary:      Effort: Pulmonary effort is normal. No tachypnea, accessory muscle usage or respiratory distress.      Breath sounds: Normal breath sounds.   Abdominal:      Palpations: There is no hepatomegaly.      Tenderness: There is no abdominal tenderness.   Musculoskeletal:      Right lower leg: No tenderness. No edema.      Left lower leg: No tenderness. No edema.   Skin:     General: Skin is warm and dry.      Capillary Refill: Capillary refill takes less than 2 seconds.   Neurological:      General: No focal deficit present.      Mental Status: She is  alert.           Medical Decision Making & ED Course   Medical Decision Makin y.o. female presents ED after syncopal episode.  Vital signs stable.  Obtain CBC, BMP, EKG, serial troponin, BNP, chest x-ray labs significant for potassium of 3.1 which was replaced.  Otherwise no other acute findings.  Chest x-ray negative.  Patient request to be discharged home.  Therefore, will discharge patient home with instruction to follow-up with PCP.  Advised to come back to ED for any worsening symptoms.  ----      Differential diagnoses considered include but are not limited to: Orthostasis, vagal reaction, arrhythmia, hypoglycemia, ACS, PE, valvular abnormality, dehydration, electrolyte abnormality, hypoglycemia          EKG Independent Interpretation: EKG interpreted by myself. Please see ED Course for full interpretation.    Independent Result Review and Interpretation: Relevant laboratory and radiographic results were reviewed and independently interpreted by myself.  As necessary, they are commented on in the ED Course.    Chronic conditions affecting the patient's care: As documented above in [facility]    The patient was discussed with the following consultants/services: None    Care Considerations: As documented above in [facility]    ED Course:  Diagnoses as of 25   Hypokalemia   Syncope, unspecified syncope type       Disposition   As a result of the work-up, the patient was discharged home.  she was informed of her diagnosis and instructed to come back with any concerns or worsening of condition.  she and was agreeable to the plan as discussed above.  she was given the opportunity to ask questions.  All of the patient's questions were answered.    Procedures   Procedures        Joshua Ho DO  Emergency Medicine                                                       Joshua Ho DO  [id number]

## 2025-07-07 LAB
ATRIAL RATE: 98 BPM
P AXIS: 59 DEGREES
PR INTERVAL: 135 MS
Q ONSET: 249 MS
QRS COUNT: 15 BEATS
QRS DURATION: 86 MS
QT INTERVAL: 352 MS
QTC CALCULATION(BAZETT): 450 MS
QTC FREDERICIA: 414 MS
R AXIS: 48 DEGREES
T AXIS: 43 DEGREES
T OFFSET: 425 MS
VENTRICULAR RATE: 98 BPM
